# Patient Record
Sex: MALE
[De-identification: names, ages, dates, MRNs, and addresses within clinical notes are randomized per-mention and may not be internally consistent; named-entity substitution may affect disease eponyms.]

---

## 2019-08-30 ENCOUNTER — APPOINTMENT (OUTPATIENT)
Dept: OTOLARYNGOLOGY | Facility: CLINIC | Age: 74
End: 2019-08-30
Payer: MEDICARE

## 2019-08-30 VITALS
HEART RATE: 67 BPM | WEIGHT: 180 LBS | BODY MASS INDEX: 28.25 KG/M2 | SYSTOLIC BLOOD PRESSURE: 123 MMHG | HEIGHT: 67 IN | DIASTOLIC BLOOD PRESSURE: 75 MMHG

## 2019-08-30 DIAGNOSIS — K21.9 GASTRO-ESOPHAGEAL REFLUX DISEASE W/OUT ESOPHAGITIS: ICD-10-CM

## 2019-08-30 DIAGNOSIS — J34.2 DEVIATED NASAL SEPTUM: ICD-10-CM

## 2019-08-30 DIAGNOSIS — Z86.79 PERSONAL HISTORY OF OTHER DISEASES OF THE CIRCULATORY SYSTEM: ICD-10-CM

## 2019-08-30 DIAGNOSIS — H93.293 OTHER ABNORMAL AUDITORY PERCEPTIONS, BILATERAL: ICD-10-CM

## 2019-08-30 DIAGNOSIS — J31.0 CHRONIC RHINITIS: ICD-10-CM

## 2019-08-30 DIAGNOSIS — H61.23 IMPACTED CERUMEN, BILATERAL: ICD-10-CM

## 2019-08-30 PROCEDURE — 99203 OFFICE O/P NEW LOW 30 MIN: CPT | Mod: 25

## 2019-08-30 PROCEDURE — 31231 NASAL ENDOSCOPY DX: CPT

## 2019-08-30 PROCEDURE — 92567 TYMPANOMETRY: CPT

## 2019-08-30 PROCEDURE — 92557 COMPREHENSIVE HEARING TEST: CPT

## 2019-08-30 PROCEDURE — 69210 REMOVE IMPACTED EAR WAX UNI: CPT

## 2019-08-30 RX ORDER — RANITIDINE 300 MG/1
300 TABLET ORAL
Qty: 30 | Refills: 5 | Status: ACTIVE | COMMUNITY
Start: 2019-08-30 | End: 1900-01-01

## 2019-08-30 NOTE — PROCEDURE
[FreeTextEntry3] : A. complete audiometric evaluation was done. This showed normal hearing in both ears except for a slight drop at 4000 Hz in the right ear with type A. tympanograms and excellent discrimination.

## 2019-08-30 NOTE — PHYSICAL EXAM
[FreeTextEntry1] : General:\par The patient was alert and oriented and in no distress.\par Voice was clear.\par \par Face:\par The patient had no facial asymmetry or mass.\par The skin was unremarkable.\par \par Ears:\par  Procedure note:\par  Removal of Cerumen Impactions, both ears:  29789-60\par Both external ears were normal.\par There were symptomatic cerumen impactions in both ears.  These were cleared microscopically without trauma using both suction and curettes.  After clearing,  both ear canals were clear both eardrums were intact and mobile.  \par \par Oral cavity:\par The oral mucosa was normal.\par The oral and base of tongue were clear and without mass.\par The gingival and buccal mucosa were moist and without lesions.\par The palate moved well.\par There was no cleft to the palate.\par There appeared to be good salivary flow.  \par There was no pus, erythema or mass in the oral cavity.\par \par Neck: \par The neck was symmetrical.\par The parotid and submandibular glands were normal without masses.\par The trachea was midline and there was no unusual crepitus.\par The thyroid was smooth and nontender and no masses were palpated.\par There was no significant cervical adenopathy.\par \par Neuro:\par Neurologically, the patient was awake, alert, and oriented to person, place and time. There were no obvious focal neurologic abnormalities.  Cranial nerves II through XII were grossly intact.\par \par Nasal endoscopy: \par CPT 26568\par Procedure Note:\par \par Nasal endoscopy was done with topical anesthesia of Pontocaine and Afrin and a      nasal endoscope.\par Indication: Nasal congestion, rule out sinusitis.\par Procedure: The nasal cavity was anesthetized with topical Afrin and Pontocaine. An  endoscope was used and inserted into the nasal cavity.\par Attention was first paid to the anterior nasal cavity.\par Endocoscopy was performed to inspect the interior of the nasal cavity, the nasal septum,  the middle and superior meati, the inferior, middle and superior turbinates, and the spheno-ethmoidal  recesses, the nasopharynx and eustachian tube orifices bilaterally. \par All findings were normal except:\par This showed that the nasal mucosa was boggy with a significant left septal deflection there was thick but clear mucus in the right middle meatus but no polyps, purulence or masses.\par \par Flexible fiberoptic laryngoscopy: [facility] [id number]\par Indications: Dysphagia\par Procedure note:\par \par Flexible fiberoptic laryngoscopy was performed because of dysphagia and because of the patient's inability to tolerate adequate mirror examination.\par \par The nasal cavity was anesthetized with Pontocaine and Afrin.\par The flexible endoscope was placed into the patient's nasal cavity.\par The nasopharynx was without masses.\par The oropharynx, vallecula and base of tongue had no masses.\par The epiglottis, aryepiglottic folds and false vocal cords were normal.\par The pyriform sinuses were without mucosal lesions or pooling of secretions.  \par The laryngeal ventricles were without lesions.\par The visualized subglottis was without mass.\par The lateral and posterior pharyngeal walls were clear and symmetrical.\par The vocal folds were clear and mobile; they abducted and adducted normally.\par There was no interarytenoid mass or fullness.\par \par There was posterior pharyngeal inflammation and there was a prominent posterior pharyngeal bulge, probably C3.

## 2019-08-30 NOTE — REASON FOR VISIT
[Initial Evaluation] : an initial evaluation for [Throat Pain] : throat pain [FreeTextEntry2] : Ear fullness

## 2019-08-30 NOTE — HISTORY OF PRESENT ILLNESS
[de-identified] : YAHIR MORIN is a 74 year old male who comes in complaining of Cerumen impactions and the feeling of a lump in his throat. The lump is worse at night and and he has to cough up some discharge at times that is slightly brown. He did have a medical evaluation. He notes some nasal congestion and is uncertain about his hearing.The patient had no other ear nose or throat complaints at this visit.

## 2019-08-30 NOTE — CONSULT LETTER
[FreeTextEntry2] : SKYLAR MANNING\par  [FreeTextEntry1] : \par \par Dear  Dr. MANNING,\par \par I had the pleasure of seeing your patient today.  \par Please see my note below.\par \par \par Thank you very much for allowing me to participate in the care of your patient.\par \par Sincerely,\par \par \par Obed\par \par \par \par Maninder Lyon MD\par [facility]\par [facility]\par  [facility]\par \par

## 2019-08-30 NOTE — ASSESSMENT
[FreeTextEntry1] : It was my impression that the patient had a cerumen impaction that was cleared.  I recommended topical moisturizing.  I recommended avoiding Q-tips.  I reviewed aural hygiene and the role of cerumen with the patient.  I suggested a repeat visit in a year or earlier should the need arise.\par He was concerned about his hearing and I reassured him that it was excellent except for a minimal noise-induced component in the right ear, and this was discussed\par He has a feeling of a lump in his throat but does bring up mucus. He has a rhinitis and laryngeal evidence of reflux.\par I suggested a course of fluticasone, and antireflux diet and  Zantac 300 mg at bedtime.  He has what appears to be a prominence of the cervical vertebrae but this is unlikely to be the cause of his symptoms.\par I would like to see him back in followup in 4-6 weeks and if not better would consider sinus imaging  and possibly cervical esophagram

## 2019-09-27 ENCOUNTER — APPOINTMENT (OUTPATIENT)
Dept: OTOLARYNGOLOGY | Facility: CLINIC | Age: 74
End: 2019-09-27

## 2019-11-11 ENCOUNTER — RECORD ABSTRACTING (OUTPATIENT)
Age: 74
End: 2019-11-11

## 2019-11-11 DIAGNOSIS — N28.1 CYST OF KIDNEY, ACQUIRED: ICD-10-CM

## 2019-11-11 DIAGNOSIS — Z87.898 PERSONAL HISTORY OF OTHER SPECIFIED CONDITIONS: ICD-10-CM

## 2019-11-11 DIAGNOSIS — Z80.42 FAMILY HISTORY OF MALIGNANT NEOPLASM OF PROSTATE: ICD-10-CM

## 2019-11-11 DIAGNOSIS — Z87.891 PERSONAL HISTORY OF NICOTINE DEPENDENCE: ICD-10-CM

## 2019-11-11 LAB
PSA FREE FLD-MCNC: 36
PSA FREE SERPL-MCNC: 0.4
PSA SERPL-MCNC: 1.1
TESTOST SERPL-MCNC: 340.9

## 2019-11-12 ENCOUNTER — APPOINTMENT (OUTPATIENT)
Dept: UROLOGY | Facility: CLINIC | Age: 74
End: 2019-11-12

## 2019-11-19 ENCOUNTER — APPOINTMENT (OUTPATIENT)
Dept: UROLOGY | Facility: CLINIC | Age: 74
End: 2019-11-19

## 2019-12-17 ENCOUNTER — APPOINTMENT (OUTPATIENT)
Dept: UROLOGY | Facility: CLINIC | Age: 74
End: 2019-12-17
Payer: MEDICARE

## 2019-12-17 VITALS
SYSTOLIC BLOOD PRESSURE: 138 MMHG | WEIGHT: 180 LBS | TEMPERATURE: 98.6 F | DIASTOLIC BLOOD PRESSURE: 84 MMHG | BODY MASS INDEX: 28.25 KG/M2 | HEIGHT: 67 IN

## 2019-12-17 DIAGNOSIS — Z00.00 ENCOUNTER FOR GENERAL ADULT MEDICAL EXAMINATION W/OUT ABNORMAL FINDINGS: ICD-10-CM

## 2019-12-17 DIAGNOSIS — N40.0 BENIGN PROSTATIC HYPERPLASIA WITHOUT LOWER URINARY TRACT SYMPMS: ICD-10-CM

## 2019-12-17 LAB
BILIRUB UR QL STRIP: NEGATIVE
CLARITY UR: CLEAR
COLLECTION METHOD: NORMAL
GLUCOSE UR-MCNC: NEGATIVE
HCG UR QL: 0.2 EU/DL
HGB UR QL STRIP.AUTO: NEGATIVE
KETONES UR-MCNC: NEGATIVE
LEUKOCYTE ESTERASE UR QL STRIP: NEGATIVE
NITRITE UR QL STRIP: NEGATIVE
PH UR STRIP: 5
PROT UR STRIP-MCNC: NEGATIVE
SP GR UR STRIP: 1.02

## 2019-12-17 PROCEDURE — 99215 OFFICE O/P EST HI 40 MIN: CPT

## 2019-12-17 PROCEDURE — 81003 URINALYSIS AUTO W/O SCOPE: CPT | Mod: QW

## 2019-12-17 PROCEDURE — 76857 US EXAM PELVIC LIMITED: CPT

## 2019-12-17 RX ORDER — LISINOPRIL 20 MG/1
20 TABLET ORAL
Refills: 0 | Status: ACTIVE | COMMUNITY

## 2019-12-17 RX ORDER — FLUTICASONE PROPIONATE 50 UG/1
50 SPRAY, METERED NASAL DAILY
Qty: 1 | Refills: 5 | Status: DISCONTINUED | COMMUNITY
Start: 2019-08-30 | End: 2019-12-17

## 2019-12-17 NOTE — ASSESSMENT
[FreeTextEntry1] : 75 yo male with family hx prostate cancer and bilateral renal cysts.  Renal US last year  reviewed.  Rads recommended repeat in 1 year and ordered today.  PSA.  Pt to call for results.

## 2019-12-17 NOTE — LETTER BODY
[Dear  ___] : Dear  [unfilled], [Consult Letter:] : I had the pleasure of evaluating your patient, [unfilled]. [Please see my note below.] : Please see my note below. [Consult Closing:] : Thank you very much for allowing me to participate in the care of this patient.  If you have any questions, please do not hesitate to contact me. [Sincerely,] : Sincerely, [FreeTextEntry3] : Dr. Bria Jenkins\par

## 2019-12-17 NOTE — PHYSICAL EXAM
[General Appearance - Well Developed] : well developed [General Appearance - Well Nourished] : well nourished [Well Groomed] : well groomed [Normal Appearance] : normal appearance [General Appearance - In No Acute Distress] : no acute distress [Skin Color & Pigmentation] : normal skin color and pigmentation [Heart Rate And Rhythm] : Heart rate and rhythm were normal [Oriented To Time, Place, And Person] : oriented to person, place, and time [Affect] : the affect was normal [Mood] : the mood was normal [Not Anxious] : not anxious [Normal Station and Gait] : the gait and station were normal for the patient's age [No Focal Deficits] : no focal deficits [Abdomen Soft] : soft [Abdomen Tenderness] : non-tender [Costovertebral Angle Tenderness] : no ~M costovertebral angle tenderness [Urethral Meatus] : meatus normal [Urinary Bladder Findings] : the bladder was normal on palpation [Scrotum] : the scrotum was normal [Testes Mass (___cm)] : there were no testicular masses [No Prostate Nodules] : no prostate nodules [FreeTextEntry1] : non-nodular prostate [] : no rash [Respiration, Rhythm And Depth] : normal respiratory rhythm and effort [Edema] : no peripheral edema [Exaggerated Use Of Accessory Muscles For Inspiration] : no accessory muscle use [No Palpable Adenopathy] : no palpable adenopathy

## 2019-12-17 NOTE — HISTORY OF PRESENT ILLNESS
[FreeTextEntry1] : 74M with history of bilateral renal cysts, who is following up 12 months from last visit.\par Renal cysts, based on last reported Renal US from 12/20/2018: On the right wer pole 1.4 cm cyst and on the left: 3 cysts, 4.5 cm lower pole, 5.2 cm upper pole and 4.7 cm mid-section of kidney. Reportedly patient had previous CT Abdomen. He recalls recent chest CT with his pulmonologist Dr Villela at [facility], but not of the abdomen. Reports no new flank pains or gross hematuria. 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 Last PSA 1.1 ( 36%),. 12/12/2018. \par UA dipstick today-negative.\par PVR today 117, vol 68 cc.

## 2020-02-12 LAB
PSA FREE FLD-MCNC: 34 %
PSA FREE SERPL-MCNC: 0.4 NG/ML
PSA SERPL-MCNC: 1.17 NG/ML
URINE CYTOLOGY: NORMAL